# Patient Record
(demographics unavailable — no encounter records)

---

## 2025-07-09 NOTE — HISTORY OF PRESENT ILLNESS
[de-identified] : REGINALDO ADDISON is a 40 year y/o F with PMH of [ ] who presents as a new patient today to establish care. CC [ ]   PMH: [ ] PSH: [ ] Fam Hx: [ ] Medications: [ ] Allergies: [ ] Social History: Lives with [ ] Works as [ ] Diet & Exercise: [ ] Tobacco use: [ ] Alcohol use: [ ] Drug use: [ ] Sexually active: [ ] Mood: [ ] Firearms: [ ]  #Health Maintenance Tdap: [ ] Gardasil: [ ] Pap: [ ] STI screen: [ ] Family Planning: [ ] Mammogram: [ ]